# Patient Record
Sex: MALE | Race: WHITE | NOT HISPANIC OR LATINO | URBAN - METROPOLITAN AREA
[De-identification: names, ages, dates, MRNs, and addresses within clinical notes are randomized per-mention and may not be internally consistent; named-entity substitution may affect disease eponyms.]

---

## 2022-09-12 ENCOUNTER — EMERGENCY (EMERGENCY)
Facility: HOSPITAL | Age: 32
LOS: 1 days | Discharge: HOME | End: 2022-09-12
Attending: EMERGENCY MEDICINE
Payer: COMMERCIAL

## 2022-09-12 VITALS
WEIGHT: 156.97 LBS | OXYGEN SATURATION: 98 % | RESPIRATION RATE: 16 BRPM | HEART RATE: 97 BPM | DIASTOLIC BLOOD PRESSURE: 71 MMHG | TEMPERATURE: 98 F | SYSTOLIC BLOOD PRESSURE: 106 MMHG

## 2022-09-12 PROCEDURE — 29125 APPL SHORT ARM SPLINT STATIC: CPT | Mod: RT

## 2022-09-12 PROCEDURE — 99284 EMERGENCY DEPT VISIT MOD MDM: CPT

## 2022-09-12 PROCEDURE — 73110 X-RAY EXAM OF WRIST: CPT

## 2022-09-12 PROCEDURE — 99283 EMERGENCY DEPT VISIT LOW MDM: CPT | Mod: 25

## 2022-09-12 PROCEDURE — 73110 X-RAY EXAM OF WRIST: CPT | Mod: 26,RT

## 2022-09-12 NOTE — ED ADULT NURSE NOTE - NSIMPLEMENTINTERV_GEN_ALL_ED
Implemented All Fall Risk Interventions:  [place] to call system. Call bell, personal items and telephone within reach. Instruct patient to call for assistance. Room bathroom lighting operational. Non-slip footwear when patient is off stretcher. Physically safe environment: no spills, clutter or unnecessary equipment. Stretcher in lowest position, wheels locked, appropriate side rails in place. Provide visual cue, wrist band, yellow gown, etc. Monitor gait and stability. Monitor for mental status changes and reorient to person, place, and time. Review medications for side effects contributing to fall risk. Reinforce activity limits and safety measures with patient and family.

## 2022-09-12 NOTE — ED PROVIDER NOTE - NSFOLLOWUPINSTRUCTIONS_ED_ALL_ED_FT
Follow up with the hand orthopedist within 1 week.  If you experience any new or worsening symptoms or if you are concerned you can always come back to the emergency for a re-evaluation.  If there were any prescriptions given to you during the visit today take them as prescribed. If you have any questions you can ask the pharmacist.

## 2022-09-12 NOTE — ED PROVIDER NOTE - ATTENDING APP SHARED VISIT CONTRIBUTION OF CARE
seen with acp  fell 5 days ago  still c/o pain to right wrist  xray shows possible triquetrial fracture  will splint  agree with acps assessment hx and physical and disposition

## 2022-09-12 NOTE — ED PROVIDER NOTE - PROGRESS NOTE DETAILS
XR shows triquetral avulsion fracture. neurovascularly intact. Cap. refill < 2 sec. Volar splint applied. Will dc with hand ortho follow up within 5 days. Pt is well appearing walking with steady gait, stable for discharge and follow up without fail with medical doctor. I had a detailed discussion with the patient and/or guardian regarding the historical points, exam findings, and any diagnostic results supporting the discharge diagnosis. Pt educated on care and need for follow up. Strict return instructions and red flag signs and symptoms discussed with patient. Questions answered. Pt shows understanding of discharge information and agrees to follow.

## 2022-09-12 NOTE — ED ADULT NURSE NOTE - NS PRO PASSIVE SMOKE EXP
Procedure To Be Performed At Next Visit: Shave Removal
Detail Level: Detailed
Introduction Text (Please End With A Colon): The following procedure was deferred:
No
Patient/Caregiver provided printed discharge information.

## 2022-09-12 NOTE — ED PROVIDER NOTE - CLINICAL SUMMARY MEDICAL DECISION MAKING FREE TEXT BOX
32-year-old male presents with persistent right wrist pain status post fall 5 days ago.  Neurovascularly intact.  Radial and ulnar pulses intact 2+ bilaterally.  No signs of infection.  Discoloration most likely related to brace being too tight. Will do XR to r/o fracture and reassess. 32-year-old male presents with persistent right wrist pain status post fall 5 days ago.  Neurovascularly intact.  Radial and ulnar pulses intact 2+ bilaterally.  No signs of infection.  Discoloration most likely related to combination of bruising and brace being too tight. Will do XR to r/o fracture and reassess.

## 2022-09-12 NOTE — ED PROVIDER NOTE - PATIENT PORTAL LINK FT
You can access the FollowMyHealth Patient Portal offered by [facility] by registering at the following website: [email or web address]. By joining [employer]’s FollowMyHealth portal, you will also be able to view your health information using other applications (apps) compatible with our system.

## 2022-09-12 NOTE — ED PROVIDER NOTE - PHYSICAL EXAMINATION
Right hand: All fingers full range of motion.  No sensory deficits.  Phalanges with blanching redness.  No change in temperature from contralateral side.  Cap refill 2 seconds.  Generalized tenderness over the carpal bones.  No snuffbox tenderness.

## 2022-09-12 NOTE — ED PROVIDER NOTE - OBJECTIVE STATEMENT
32-year-old male presents with right wrist pain status post fall 5 days ago.  Tripped and fell forward landing bilaterally [place].  Sustained abrasions to both palms of the hands.  Since the injury the left wrist pain resolved but the right wrist pain is persistent.  Pain is worse when twisting the wrist.  No radiation of pain.  Denies any numbness, tingling or focal weakness.  Has been wearing a wrist brace tightly during the day.  Also reports discoloration of all fingers x2-3 days.  No other injuries or complaints.

## 2022-09-13 PROBLEM — Z00.00 ENCOUNTER FOR PREVENTIVE HEALTH EXAMINATION: Status: ACTIVE | Noted: 2022-09-13

## 2022-09-14 ENCOUNTER — APPOINTMENT (OUTPATIENT)
Dept: ORTHOPEDIC SURGERY | Facility: CLINIC | Age: 32
End: 2022-09-14

## 2024-05-01 NOTE — ED ADULT TRIAGE NOTE - HEART RATE (BEATS/MIN)
"  [facility]  CARDIAC EP PROGRESS NOTE    Date of Admission: 4/26/2024  Date of Service: 05/01/24    Primary Care Physician: Jayant Newton MD    Chief Complaint: VT, ICD shock      Subjective      HPI: Patient's anxiety has improved. NO VT overnight      Objective   Vitals: /68 (BP Location: Left arm, Patient Position: Lying)   Pulse 74   Temp 97.7 °F (36.5 °C) (Axillary)   Resp 18   Ht 180.3 cm (70.98\")   Wt 107 kg (235 lb)   SpO2 96%   BMI 32.79 kg/m²     Physical Exam:   GENERAL: Alert, cooperative, in no acute distress.   HEART: Regular rate and rhythm; no murmurs, rubs or gallops  LUNGS: Clear to auscultation bilaterally. No wheezing, rales or rhonchi. Nonlabored breathing    Results:  Results from last 7 days   Lab Units 05/01/24  0430 04/30/24  0804 04/26/24 1956   WBC 10*3/mm3 8.92 7.51 9.29   HEMOGLOBIN g/dL 17.1 17.3 17.1   HEMATOCRIT % 52.0* 52.3* 52.5*   PLATELETS 10*3/mm3 182 185 201     Results from last 7 days   Lab Units 05/01/24  0430 04/30/24  0648 04/29/24  0747   SODIUM mmol/L 143 142 140   POTASSIUM mmol/L 3.7 3.8 4.1   CHLORIDE mmol/L 105 104 102   CO2 mmol/L 27.0 28.0 27.0   BUN mg/dL 9 10 8   CREATININE mg/dL 0.83 0.82 0.85   GLUCOSE mg/dL 94 92 94      Lab Results   Component Value Date    CHOL 121 04/28/2024    TRIG 99 04/28/2024    HDL 48 04/28/2024    LDL 54 04/28/2024    AST 24 04/26/2024    ALT 26 04/26/2024     Results from last 7 days   Lab Units 04/27/24  0528   HEMOGLOBIN A1C % 5.30     Results from last 7 days   Lab Units 04/28/24  0415   CHOLESTEROL mg/dL 121   TRIGLYCERIDES mg/dL 99   HDL CHOL mg/dL 48   LDL CHOL mg/dL 54     Results from last 7 days   Lab Units 04/26/24 1956   TSH uIU/mL 1.360   FREE T4 ng/dL 1.07             Results from last 7 days   Lab Units 04/26/24 2248 04/26/24 1956   HSTROP T ng/L 23* 25*  25*     Results from last 7 days   Lab Units 04/26/24 1956   PROBNP pg/mL 296.1         Intake/Output " Summary (Last 24 hours) at 5/1/2024 0811  Last data filed at 4/30/2024 1330  Gross per 24 hour   Intake 1250 ml   Output 2050 ml   Net -800 ml       I personally reviewed the patient's EKG/Telemetry data    Radiology Data:   XR Chest 1 View    Result Date: 4/26/2024  Impression: No active cardiopulmonary disease Electronically Signed: Joel Jonas  4/26/2024 7:42 PM EDT  Workstation ID: [id number]       Current Medications:  dofetilide, 500 mcg, Oral, Q12H  empagliflozin, 10 mg, Oral, Daily  heparin (porcine), 5,000 Units, Subcutaneous, Q8H  metoprolol succinate XL, 25 mg, Oral, Nightly  mexiletine, 150 mg, Oral, Q8H  montelukast, 10 mg, Oral, Nightly  multivitamin with minerals, 1 tablet, Oral, Daily  sacubitril-valsartan, 1 tablet, Oral, Q12H  sodium chloride, 10 mL, Intravenous, Q12H              Assessment:   Ventricular tachycardia  S/p VT RFA 3/27.24 by Dr. Adan  Returned 4/5/24 with several episodes 180-190 bpm fell into monitor zones. Zones changed to allow more VT addressed below  Tikosyn initiation 4/5/24  Nonischemic cardiomyopathy/chronic HFrEF  C 10/19/21, normal coronaries. EF 30-35% with severe global hypokinesis  TTE 10/2023: LVEF 43% no VHD- calculated EF higher than visual estimate per Dr. Mcfarlane  TTE 4/28/24: LVEF 26-30%, mild-mod MR  Patient euvolemic on exam , spO2 >90% on room air  3.  [facility] single-chamber ICD  Device interrogation 4/26/24 acceptable threshold and impedance values, battery 7.5 years, VF 4/26/24 at 1523 s/p ATP x1 then defibrillation x1 at 29 J         Plan:   Patient started on 150 mg 3 times daily of mexiletine and tolerated well.  Will plan to discharge home on this dose.  Could consider uptitrating as an outpatient if needed  Discussed the option for repeat VT ablation if he continues to have episodes in the future.  Would likely not sedate and epicardial approach.  Discussed option of referral to the advanced heart failure clinic at .  Given his further reduction in  ejection fraction as well as recurrent ventricular arrhythmias I think this would be helpful for his long-term care.  They are going to consider this.  Anxiety has improved tremendously. Okay to discharge home today from cardiology perspective.  Will work on getting patient set up at advanced hf clinic at  and possibly a Psych specialist that can help with cardiac arrest/ICD shock-related PTSD    Scribed by Barrett Ames PA-C for Dr. Robert Parra MD    I, Robert Parra MD, personally performed the services described in this documentation as scribed by the above named individual in my presence, and it is both accurate and complete.  5/2/2024  17:24 EDT     97